# Patient Record
Sex: FEMALE | Race: WHITE | NOT HISPANIC OR LATINO | Employment: OTHER | URBAN - METROPOLITAN AREA
[De-identification: names, ages, dates, MRNs, and addresses within clinical notes are randomized per-mention and may not be internally consistent; named-entity substitution may affect disease eponyms.]

---

## 2022-05-24 ENCOUNTER — ANNUAL EXAM (OUTPATIENT)
Dept: OBGYN CLINIC | Facility: CLINIC | Age: 72
End: 2022-05-24
Payer: MEDICARE

## 2022-05-24 VITALS
SYSTOLIC BLOOD PRESSURE: 130 MMHG | BODY MASS INDEX: 32.13 KG/M2 | WEIGHT: 188.2 LBS | DIASTOLIC BLOOD PRESSURE: 75 MMHG | HEIGHT: 64 IN

## 2022-05-24 DIAGNOSIS — Z01.419 ROUTINE GYNECOLOGICAL EXAMINATION: Primary | ICD-10-CM

## 2022-05-24 DIAGNOSIS — L90.0 LICHEN SCLEROSUS: ICD-10-CM

## 2022-05-24 DIAGNOSIS — E28.39 ESTROGEN DEFICIENCY: ICD-10-CM

## 2022-05-24 DIAGNOSIS — Z12.31 ENCOUNTER FOR SCREENING MAMMOGRAM FOR MALIGNANT NEOPLASM OF BREAST: ICD-10-CM

## 2022-05-24 DIAGNOSIS — Z13.820 OSTEOPOROSIS SCREENING: ICD-10-CM

## 2022-05-24 PROCEDURE — G0101 CA SCREEN;PELVIC/BREAST EXAM: HCPCS | Performed by: OBSTETRICS & GYNECOLOGY

## 2022-05-24 RX ORDER — CETIRIZINE HYDROCHLORIDE 10 MG/1
10 TABLET, CHEWABLE ORAL AS NEEDED
COMMUNITY

## 2022-05-24 RX ORDER — CHLORAL HYDRATE 500 MG
1000 CAPSULE ORAL DAILY
COMMUNITY

## 2022-05-24 RX ORDER — ALPRAZOLAM 0.25 MG/1
0.25 TABLET ORAL
COMMUNITY
Start: 2022-01-18

## 2022-05-24 RX ORDER — BUPROPION HYDROCHLORIDE 300 MG/1
300 TABLET ORAL DAILY
COMMUNITY
Start: 2022-05-11

## 2022-05-24 RX ORDER — TRIAMCINOLONE ACETONIDE 5 MG/G
OINTMENT TOPICAL 2 TIMES WEEKLY
Qty: 15 G | Refills: 3 | Status: SHIPPED | OUTPATIENT
Start: 2022-05-26

## 2022-05-24 RX ORDER — MELATONIN
1000 DAILY
COMMUNITY

## 2022-05-24 NOTE — PROGRESS NOTES
[address] [facility], [address], [address] [facility]    ASSESSMENT/PLAN: Zaida Xiao is a 70 y o   who presents for annual gynecologic exam     Encounter for routine gynecologic examination  - Routine well woman exam completed today  - Cervical Cancer Screening: Current ASCCP Guidelines reviewed  Last Pap: Not on file none   Next Pap Due: na  No   - COVID vaccine  Pfizser x  3    - Breast Cancer Screening: Last Mammogram 2018   Sisters  Half w  Breast cancer    - Colorectal cancer screening + polyp  Due in    - The following were reviewed in today's visit: breast self exam, mammography screening ordered, menopause, osteoporosis, adequate intake of calcium and vitamin D, exercise, healthy diet, DEXA ordered and colonoscopy discussed     Additional problems addressed during this visit:  1  Routine gynecological examination    2  Encounter for screening mammogram for malignant neoplasm of breast  -     Mammo screening bilateral w 3d & cad; Future    3  BMI 32 0-32 9,adult    4  Lichen sclerosus  -     triamcinolone (KENALOG) 0 5 % ointment; Apply topically 2 (two) times a week    5  Estrogen deficiency  -     DXA bone density spine hip and pelvis; Future    6  Osteoporosis screening  -     DXA bone density spine hip and pelvis; Future        CC:  Annual Gynecologic Examination    HPI: Zaida Xiao is a 70 y o  Sarthak Any who presents for annual gynecologic examination  69 yo here for wellness exaam   + hx of lichen sclerosis  Past medical history significant for elevated blood pressure reading without diagnosis of hypertension, depression, obesity, last colonoscopy was 2019 and [facility]   Surgical history for hysterectomy in ,  vulvar biopsy in  for early LS at 2016 torn meniscus to the left knee  Family history father  of melanoma in 2016,  2 half sisters with breast cancer          The following portions of the patient's history were reviewed and updated as appropriate: She  has a past medical history of Depression, Hypertension, Lichen sclerosus, Malignant melanoma of left upper extremity including shoulder ([address] ), and Malignant melanoma of upper extremity, including shoulder ([facility] [address] )  She  has a past surgical history that includes Colonoscopy (2019); Hysterectomy; Knee surgery (Left, 2016); and Cataract extraction, bilateral (Bilateral)  Her family history includes Breast cancer in her half-sister and paternal aunt; Dementia in her sister; Heart block in her sister; Hypertension in her father; Melanoma in her father; Throat cancer in her sister  She  reports that she has quit smoking  She has never used smokeless tobacco  She reports previous alcohol use  She reports that she does not use drugs  Current Outpatient Medications   Medication Sig Dispense Refill    ALPRAZolam (XANAX) 0 25 mg tablet Take 0 25 mg by mouth      buPROPion (WELLBUTRIN XL) 300 mg 24 hr tablet Take 300 mg by mouth in the morning   cetirizine (ZyrTEC) 10 MG chewable tablet Chew 10 mg if needed for allergies      cholecalciferol (VITAMIN D3) 1,000 units tablet Take 1,000 Units by mouth in the morning   Omega-3 Fatty Acids (fish oil) 1,000 mg Take 1,000 mg by mouth in the morning   sertraline (ZOLOFT) 50 mg tablet Pt taking needed, wants to ween herself off      [START ON 5/26/2022] triamcinolone (KENALOG) 0 5 % ointment Apply topically 2 (two) times a week 15 g 3     No current facility-administered medications for this visit  She is allergic to gatifloxacin       Review of Systems:  All systems normal except as noted in HPI          Objective:  /75 (BP Location: Left arm, Patient Position: Sitting, Cuff Size: Standard)   Ht 5' 3 5" (1 613 m)   Wt 85 4 kg (188 lb 3 2 oz)   BMI 32 82 kg/m²    Physical Exam  Vitals and nursing note reviewed  Constitutional:       Appearance: Normal appearance  HENT:      Head: Normocephalic     Cardiovascular:      Rate and Rhythm: Normal rate and regular rhythm  Pulmonary:      Effort: Pulmonary effort is normal       Breath sounds: Normal breath sounds  Chest:      Chest wall: No mass, lacerations, swelling, tenderness or edema  Breasts: Adis Score is 4  Breasts are symmetrical       Right: Normal  No swelling, bleeding, inverted nipple, mass, nipple discharge, skin change, tenderness, axillary adenopathy or supraclavicular adenopathy  Left: No swelling, bleeding, inverted nipple, mass, nipple discharge, skin change, tenderness, axillary adenopathy or supraclavicular adenopathy  Abdominal:      General: Abdomen is flat  Bowel sounds are normal       Palpations: Abdomen is soft  Genitourinary:     General: Normal vulva  Exam position: Lithotomy position  Pubic Area: No rash  Adis stage (genital): 4       Labia:         Right: No rash, tenderness or lesion  Left: No rash, tenderness or lesion  Urethra: No urethral pain, urethral swelling or urethral lesion  Vagina: Normal       Cervix: No cervical motion tenderness or discharge  Adnexa: Right adnexa normal and left adnexa normal       Rectum: Normal        Musculoskeletal:         General: Normal range of motion  Cervical back: Neck supple  Lymphadenopathy:      Upper Body:      Right upper body: No supraclavicular, axillary or pectoral adenopathy  Left upper body: No supraclavicular, axillary or pectoral adenopathy  Lower Body: No right inguinal adenopathy  No left inguinal adenopathy  Skin:     General: Skin is warm and dry  Neurological:      General: No focal deficit present  Mental Status: She is alert and oriented to person, place, and time  Psychiatric:         Mood and Affect: Mood normal          Behavior: Behavior normal          Thought Content:  Thought content normal          Judgment: Judgment normal

## 2022-07-05 DIAGNOSIS — Z13.820 OSTEOPOROSIS SCREENING: ICD-10-CM

## 2022-07-05 DIAGNOSIS — E28.39 ESTROGEN DEFICIENCY: ICD-10-CM

## 2022-07-12 DIAGNOSIS — Z12.31 ENCOUNTER FOR SCREENING MAMMOGRAM FOR MALIGNANT NEOPLASM OF BREAST: ICD-10-CM

## 2023-06-05 ENCOUNTER — OFFICE VISIT (OUTPATIENT)
Dept: OBGYN CLINIC | Facility: CLINIC | Age: 73
End: 2023-06-05
Payer: MEDICARE

## 2023-06-05 VITALS
HEIGHT: 64 IN | DIASTOLIC BLOOD PRESSURE: 74 MMHG | SYSTOLIC BLOOD PRESSURE: 144 MMHG | WEIGHT: 195 LBS | BODY MASS INDEX: 33.29 KG/M2

## 2023-06-05 DIAGNOSIS — E28.39 ESTROGEN DEFICIENCY: ICD-10-CM

## 2023-06-05 DIAGNOSIS — Z78.0 POSTMENOPAUSAL: Primary | ICD-10-CM

## 2023-06-05 DIAGNOSIS — N39.3 SUI (STRESS URINARY INCONTINENCE, FEMALE): ICD-10-CM

## 2023-06-05 DIAGNOSIS — Z12.31 ENCOUNTER FOR SCREENING MAMMOGRAM FOR MALIGNANT NEOPLASM OF BREAST: ICD-10-CM

## 2023-06-05 DIAGNOSIS — L90.0 LICHEN SCLEROSUS: ICD-10-CM

## 2023-06-05 PROBLEM — Z01.419 ROUTINE GYNECOLOGICAL EXAMINATION: Status: ACTIVE | Noted: 2023-06-05

## 2023-06-05 PROCEDURE — 99213 OFFICE O/P EST LOW 20 MIN: CPT | Performed by: OBSTETRICS & GYNECOLOGY

## 2023-06-05 RX ORDER — TRIAMCINOLONE ACETONIDE 5 MG/G
OINTMENT TOPICAL 2 TIMES WEEKLY
Qty: 15 G | Refills: 3 | Status: SHIPPED | OUTPATIENT
Start: 2023-06-05

## 2023-06-05 RX ORDER — KETOCONAZOLE 20 MG/ML
SHAMPOO TOPICAL
COMMUNITY
Start: 2023-06-01

## 2023-06-05 NOTE — PROGRESS NOTES
[address] [facility], [address], [address] [facility]    ASSESSMENT/PLAN: Annie Kumari is a 67 y o  Kedar Balbuena who presents for annual gynecologic exam       - gyn  Fu   exam completed today  - Cervical Cancer Screening: Current ASCCP Guidelines reviewed  Last Pap: 05/24/2022     - Breast Cancer Screening: Last Mammogram 06/30/2022,   - Colorectal cancer screening was not ordered  Pt is scheduled in   One month   - The following were reviewed in today's visit: breast self exam, menopause, osteoporosis, adequate intake of calcium and vitamin D, exercise, healthy diet and colonoscopy discussed and ordered    Additional problems addressed during this visit:  1  Postmenopausal    2  Estrogen deficiency    3  Lichen sclerosus  -     triamcinolone (KENALOG) 0 5 % ointment; Apply topically 2 (two) times a week    4  Encounter for screening mammogram for malignant neoplasm of breast  -     Mammo screening bilateral w 3d & cad; Future        CC:  Annual Gynecologic Examination    HPI: Annie Kumari is a 67 y o  Kedar Balbuena who presents for annual gynecologic examination  66 yo here for gyn fu exam    Not using  Her   Steroid ointment   Forgets  Some  Off and on  Itching  No bleeding        The following portions of the patient's history were reviewed and updated as appropriate: She  has a past medical history of Depression, Hypertension, Lichen sclerosus, Malignant melanoma of left upper extremity including shoulder ([address] ), Malignant melanoma of upper extremity, including shoulder ([address] ), Osteopenia after menopause (06/30/2022), and Screening for breast cancer (06/30/2022)  She  has a past surgical history that includes Colonoscopy (2019); Hysterectomy; Knee surgery (Left, 2016); and Cataract extraction, bilateral (Bilateral)  Her family history includes Breast cancer in her half-sister and paternal aunt; Dementia in her sister; Heart block in her sister;  Hypertension in her father; Melanoma in her father; "Throat cancer in her sister  She  reports that she has quit smoking  She has never been exposed to tobacco smoke  She has never used smokeless tobacco  She reports that she does not currently use alcohol  She reports that she does not use drugs  Current Outpatient Medications   Medication Sig Dispense Refill   • ALPRAZolam (XANAX) 0 25 mg tablet Take 0 25 mg by mouth     • buPROPion (WELLBUTRIN XL) 300 mg 24 hr tablet Take 300 mg by mouth in the morning  • cetirizine (ZyrTEC) 10 MG chewable tablet Chew 10 mg if needed for allergies     • cholecalciferol (VITAMIN D3) 1,000 units tablet Take 1,000 Units by mouth in the morning  • ketoconazole (NIZORAL) 2 % shampoo      • Omega-3 Fatty Acids (fish oil) 1,000 mg Take 1,000 mg by mouth in the morning  • sertraline (ZOLOFT) 50 mg tablet Pt taking needed, wants to ween herself off     • triamcinolone (KENALOG) 0 5 % ointment Apply topically 2 (two) times a week 15 g 3     No current facility-administered medications for this visit  She is allergic to gatifloxacin       Review of Systems:  All systems normal except as noted in HPI          Objective:  /74 (BP Location: Left arm, Patient Position: Sitting, Cuff Size: Standard)   Ht 5' 4\" (1 626 m)   Wt 88 5 kg (195 lb)   BMI 33 47 kg/m²    Physical Exam  Vitals and nursing note reviewed  Constitutional:       Appearance: Normal appearance  HENT:      Head: Normocephalic  Cardiovascular:      Rate and Rhythm: Normal rate and regular rhythm  Pulses: Normal pulses  Heart sounds: Normal heart sounds  Pulmonary:      Effort: Pulmonary effort is normal       Breath sounds: Normal breath sounds  Chest:      Chest wall: No mass, lacerations, swelling, tenderness or edema  Breasts: Adis Score is 4  Breasts are symmetrical       Right: Normal  No swelling, bleeding, inverted nipple, mass, nipple discharge, skin change or tenderness        Left: No swelling, bleeding, inverted " nipple, mass, nipple discharge, skin change or tenderness  Abdominal:      General: Abdomen is flat  Bowel sounds are normal       Palpations: Abdomen is soft  Genitourinary:     General: Normal vulva  Exam position: Lithotomy position  Pubic Area: No rash  Adis stage (genital): 4       Labia:         Right: No rash, tenderness or lesion  Left: No rash, tenderness or lesion  Urethra: No urethral pain, urethral swelling or urethral lesion  Vagina: Normal       Cervix: No cervical motion tenderness or discharge  Uterus: Absent  Adnexa: Right adnexa normal and left adnexa normal       Rectum: Normal              Musculoskeletal:         General: Normal range of motion  Cervical back: Neck supple  Lymphadenopathy:      Upper Body:      Right upper body: No supraclavicular, axillary or pectoral adenopathy  Left upper body: No supraclavicular, axillary or pectoral adenopathy  Lower Body: No right inguinal adenopathy  No left inguinal adenopathy  Skin:     General: Skin is warm and dry  Neurological:      General: No focal deficit present  Mental Status: She is alert and oriented to person, place, and time     Psychiatric:         Mood and Affect: Mood normal

## 2023-06-05 NOTE — PATIENT INSTRUCTIONS
Calcium 1200-1500mg + 800-1000 IU Vit D daily unless otherwise directed  Avoid falls  Exercise 150 minutes per week minimum including weight bearing exercises  DEXA scan-   2024      No further paps after age 72 as long as there has been adequate normal paps completed, no history of MELISSA 2  or a more severe pap diagnosis in the last 20 years  Annual mammogram and monthly breast self exam recommended   Colonoscopy-  due  now         Kegels 20 times twice daily  Silicone based lubricant with sex  Vaginal moisturizers twice weekly as needed

## 2023-08-04 PROBLEM — Z01.419 ROUTINE GYNECOLOGICAL EXAMINATION: Status: RESOLVED | Noted: 2023-06-05 | Resolved: 2023-08-04

## 2024-06-10 ENCOUNTER — ANNUAL EXAM (OUTPATIENT)
Dept: OBGYN CLINIC | Facility: CLINIC | Age: 74
End: 2024-06-10
Payer: MEDICARE

## 2024-06-10 VITALS
SYSTOLIC BLOOD PRESSURE: 126 MMHG | DIASTOLIC BLOOD PRESSURE: 80 MMHG | HEIGHT: 63 IN | BODY MASS INDEX: 33.66 KG/M2 | WEIGHT: 190 LBS

## 2024-06-10 DIAGNOSIS — Z78.0 POSTMENOPAUSAL: ICD-10-CM

## 2024-06-10 DIAGNOSIS — Z12.31 ENCOUNTER FOR SCREENING MAMMOGRAM FOR MALIGNANT NEOPLASM OF BREAST: ICD-10-CM

## 2024-06-10 DIAGNOSIS — Z13.820 OSTEOPOROSIS SCREENING: ICD-10-CM

## 2024-06-10 DIAGNOSIS — N39.3 SUI (STRESS URINARY INCONTINENCE, FEMALE): ICD-10-CM

## 2024-06-10 DIAGNOSIS — E28.39 ESTROGEN DEFICIENCY: ICD-10-CM

## 2024-06-10 DIAGNOSIS — Z01.411 ENCOUNTER FOR GYNECOLOGICAL EXAMINATION WITH ABNORMAL FINDING: Primary | ICD-10-CM

## 2024-06-10 DIAGNOSIS — L90.0 LICHEN SCLEROSUS: ICD-10-CM

## 2024-06-10 PROBLEM — Z01.419 ENCOUNTER FOR GYNECOLOGICAL EXAMINATION WITHOUT ABNORMAL FINDING: Status: ACTIVE | Noted: 2024-06-10

## 2024-06-10 PROCEDURE — G0101 CA SCREEN;PELVIC/BREAST EXAM: HCPCS | Performed by: OBSTETRICS & GYNECOLOGY

## 2024-06-10 RX ORDER — TRIAMCINOLONE ACETONIDE 5 MG/G
OINTMENT TOPICAL 2 TIMES WEEKLY
Qty: 15 G | Refills: 3 | Status: SHIPPED | OUTPATIENT
Start: 2024-06-10

## 2024-06-10 NOTE — PATIENT INSTRUCTIONS
Calcium 1200-1500mg + 800-1000 IU Vit D daily unless otherwise directed. Avoid falls. Exercise 150 minutes per week minimum including weight bearing exercises. DEXA scan-  order given      . No further paps after age 65 as long as there has been adequate normal paps completed, no history of MELISSA 2  or a more severe pap diagnosis in the last 20 years.   Annual mammogram and monthly breast self exam recommended .   Colonoscopy-    due in 4 years     .  Kegels 20 times twice daily. Silicone based lubricant with sex. Vaginal moisturizers twice weekly as needed.

## 2024-06-10 NOTE — PROGRESS NOTES
[facility] - [address], [address], [place] [address]    ASSESSMENT/PLAN: Karie Dolan is a 74 y.o.  who presents for annual gynecologic exam.    Encounter for routine gynecologic examination  - Routine well woman exam completed today.  - Cervical Cancer Screening: Current ASCCP Guidelines reviewed. Last Pap: 2022 . Next Pap Due: over age 65  - COVID vaccine   - Breast Cancer Screening: Last Mammogram 2022,   - Colorectal cancer screening was not ordered.  - The following were reviewed in today's visit: breast self exam, mammography screening ordered, adequate intake of calcium and vitamin D, exercise, healthy diet, and colonoscopy discussed and ordered    Additional problems addressed during this visit:  1. Encounter for gynecological examination with abnormal finding  2. Encounter for screening mammogram for malignant neoplasm of breast  -     Mammo screening bilateral w 3d & cad; Future  3. Postmenopausal  -     DXA bone density spine hip and pelvis; Future  4. Lichen sclerosus  Comments:  reviewed with pt  use of   steroid cream  at least  twice a week  and Aquaphor between  Orders:  -     triamcinolone (KENALOG) 0.5 % ointment; Apply topically 2 (two) times a week  5. APRIL (stress urinary incontinence, female)  6. Estrogen deficiency  -     DXA bone density spine hip and pelvis; Future  7. Osteoporosis screening  -     DXA bone density spine hip and pelvis; Future      CC:  Annual Gynecologic Examination    HPI: Karie Dolan is a 74 y.o.  who presents for annual gynecologic examination.  73 yo here for wellness exxam.  No bleeding , blaoting or satiety.   No itching      Not really using her   steroid ointment  due to  no itching.       The following portions of the patient's history were reviewed and updated as appropriate: She  has a past medical history of Depression, Hypertension, Lichen sclerosus, Malignant melanoma of left upper extremity including shoulder  "(HCC), Malignant melanoma of upper extremity, including shoulder (HCC), Osteopenia after menopause (06/30/2022), and Screening for breast cancer (06/30/2022).  She  has a past surgical history that includes Colonoscopy (2023); Hysterectomy; Knee surgery (Left, 2016); and Cataract extraction, bilateral (Bilateral).  Her family history includes Breast cancer in her half-sister and paternal aunt; Dementia in her sister; Heart block in her sister; Hypertension in her father; Melanoma in her father; Throat cancer in her sister.  She  reports that she has quit smoking. She has never been exposed to tobacco smoke. She has never used smokeless tobacco. She reports that she does not currently use alcohol. She reports that she does not use drugs.  Current Outpatient Medications   Medication Sig Dispense Refill   • ALPRAZolam (XANAX) 0.25 mg tablet Take 0.25 mg by mouth     • buPROPion (WELLBUTRIN XL) 300 mg 24 hr tablet Take 300 mg by mouth in the morning.     • triamcinolone (KENALOG) 0.5 % ointment Apply topically 2 (two) times a week 15 g 3   • cholecalciferol (VITAMIN D3) 1,000 units tablet Take 1,000 Units by mouth in the morning.     • sertraline (ZOLOFT) 50 mg tablet Pt taking needed, wants to ween herself off (Patient not taking: Reported on 6/10/2024)       No current facility-administered medications for this visit.     She is allergic to gatifloxacin..    Review of Systems:  All systems normal except as noted in HPI          Objective:  /80 (BP Location: Right arm, Patient Position: Sitting, Cuff Size: Standard)   Ht 5' 2.5\" (1.588 m)   Wt 86.2 kg (190 lb)   BMI 34.20 kg/m²    Physical Exam  Vitals and nursing note reviewed.   Constitutional:       Appearance: Normal appearance.   HENT:      Head: Normocephalic.   Cardiovascular:      Rate and Rhythm: Normal rate and regular rhythm.      Pulses: Normal pulses.      Heart sounds: Normal heart sounds.   Pulmonary:      Effort: Pulmonary effort is normal.     "  Breath sounds: Normal breath sounds.   Chest:      Chest wall: No mass, lacerations, swelling, tenderness or edema.   Breasts:     Adis Score is 4.      Breasts are symmetrical.      Right: Normal. No swelling, bleeding, inverted nipple, mass, nipple discharge, skin change or tenderness.      Left: No swelling, bleeding, inverted nipple, mass, nipple discharge, skin change or tenderness.   Abdominal:      General: Abdomen is flat. Bowel sounds are normal.      Palpations: Abdomen is soft.   Genitourinary:     General: Normal vulva.      Exam position: Lithotomy position.      Pubic Area: No rash.       Adis stage (genital): 4.      Labia:         Right: Rash present. No tenderness or lesion.         Left: Rash present. No tenderness or lesion.       Urethra: No urethral pain, urethral swelling or urethral lesion.      Vagina: Normal.      Uterus: Absent.       Adnexa: Right adnexa normal and left adnexa normal.      Rectum: Normal.                Comments: wHitening noted  b/l    Musculoskeletal:         General: Normal range of motion.      Cervical back: Normal range of motion and neck supple.   Lymphadenopathy:      Upper Body:      Right upper body: No supraclavicular, axillary or pectoral adenopathy.      Left upper body: No supraclavicular, axillary or pectoral adenopathy.      Lower Body: No right inguinal adenopathy. No left inguinal adenopathy.   Skin:     General: Skin is warm and dry.   Neurological:      General: No focal deficit present.      Mental Status: She is alert and oriented to person, place, and time.   Psychiatric:         Mood and Affect: Mood normal.         Behavior: Behavior normal.         Thought Content: Thought content normal.         Judgment: Judgment normal.

## 2024-07-10 PROBLEM — Z01.419 ENCOUNTER FOR GYNECOLOGICAL EXAMINATION WITHOUT ABNORMAL FINDING: Status: RESOLVED | Noted: 2024-06-10 | Resolved: 2024-07-10

## 2024-07-10 PROBLEM — Z13.820 OSTEOPOROSIS SCREENING: Status: RESOLVED | Noted: 2024-06-10 | Resolved: 2024-07-10

## 2024-07-29 ENCOUNTER — HOSPITAL ENCOUNTER (OUTPATIENT)
Dept: HOSPITAL 99 - WDC | Age: 74
End: 2024-07-29
Payer: MEDICARE

## 2024-07-29 DIAGNOSIS — Z12.31: ICD-10-CM

## 2024-07-29 DIAGNOSIS — E28.39: ICD-10-CM

## 2024-07-29 DIAGNOSIS — Z78.0: Primary | ICD-10-CM

## 2024-08-02 DIAGNOSIS — Z78.0 POSTMENOPAUSAL: ICD-10-CM

## 2024-08-02 DIAGNOSIS — Z13.820 OSTEOPOROSIS SCREENING: ICD-10-CM

## 2024-08-02 DIAGNOSIS — E28.39 ESTROGEN DEFICIENCY: ICD-10-CM

## 2025-06-16 ENCOUNTER — OFFICE VISIT (OUTPATIENT)
Dept: OBGYN CLINIC | Facility: CLINIC | Age: 75
End: 2025-06-16
Payer: MEDICARE

## 2025-06-16 VITALS
HEIGHT: 62 IN | WEIGHT: 183 LBS | SYSTOLIC BLOOD PRESSURE: 128 MMHG | BODY MASS INDEX: 33.68 KG/M2 | DIASTOLIC BLOOD PRESSURE: 70 MMHG

## 2025-06-16 DIAGNOSIS — Z78.0 POSTMENOPAUSAL: ICD-10-CM

## 2025-06-16 DIAGNOSIS — L90.0 LICHEN SCLEROSUS: ICD-10-CM

## 2025-06-16 DIAGNOSIS — E28.39 ESTROGEN DEFICIENCY: ICD-10-CM

## 2025-06-16 DIAGNOSIS — Z12.31 ENCOUNTER FOR SCREENING MAMMOGRAM FOR MALIGNANT NEOPLASM OF BREAST: ICD-10-CM

## 2025-06-16 DIAGNOSIS — L90.0 LICHEN SCLEROSUS: Primary | ICD-10-CM

## 2025-06-16 PROBLEM — Z01.419 ENCOUNTER FOR GYNECOLOGICAL EXAMINATION WITHOUT ABNORMAL FINDING: Status: ACTIVE | Noted: 2025-06-16

## 2025-06-16 PROCEDURE — 99214 OFFICE O/P EST MOD 30 MIN: CPT | Performed by: OBSTETRICS & GYNECOLOGY

## 2025-06-16 RX ORDER — TRIAMCINOLONE ACETONIDE 5 MG/G
OINTMENT TOPICAL 2 TIMES WEEKLY
Qty: 15 G | Refills: 3 | Status: SHIPPED | OUTPATIENT
Start: 2025-06-16

## 2025-06-16 RX ORDER — KETOCONAZOLE 20 MG/ML
SHAMPOO, SUSPENSION TOPICAL
COMMUNITY
Start: 2025-06-09

## 2025-06-16 RX ORDER — LISINOPRIL 20 MG/1
TABLET ORAL
COMMUNITY
Start: 2025-05-09

## 2025-06-16 RX ORDER — TERBINAFINE HYDROCHLORIDE 250 MG/1
1 TABLET ORAL DAILY
COMMUNITY
Start: 2025-05-13

## 2025-06-16 NOTE — PROGRESS NOTES
[facility] - [address], [address], [place] [address]    ASSESSMENT/PLAN: Karie Dolan is a 75 y.o.  who presents for  fu gynecologic exam.    Encounter for  fu  gynecologic examination  - Routine well woman exam completed today.  - Cervical Cancer Screening: Current ASCCP Guidelines reviewed. Last Pap: 06/10/2024 . Next Pap Due: over  65  - Breast Cancer Screening: Last Mammogram 2022, 2024  - Colorectal cancer screening was not ordered.  - The following were reviewed in today's visit: breast self exam, mammography screening ordered, menopause, adequate intake of calcium and vitamin D, exercise, healthy diet, and colonoscopy discussed     Additional problems addressed during this visit:  1. Lichen sclerosus  -     triamcinolone (KENALOG) 0.5 % ointment; Apply topically 2 (two) times a week  2. Encounter for screening mammogram for malignant neoplasm of breast  -     Mammo screening bilateral w 3d and cad; Future  3. Postmenopausal  4. BMI 32.0-32.9,adult  5. Estrogen deficiency  6. Lichen sclerosus  Comments:  reviewed with pt  use of   steroid cream  at least  twice a week  and Aquaphor between  Orders:  -     triamcinolone (KENALOG) 0.5 % ointment; Apply topically 2 (two) times a week      CC:  fu  Gynecologic Examination    HPI: Karie Dolan is a 75 y.o.  who presents for annual gynecologic examination.  HPI    The following portions of the patient's history were reviewed and updated as appropriate: She  has a past medical history of Depression, Hypertension, Lichen sclerosus, Malignant melanoma of left upper extremity including shoulder (HCC), Malignant melanoma of upper extremity, including shoulder (HCC), Osteopenia after menopause (2022), and Screening for breast cancer (2024).  She  has a past surgical history that includes Colonoscopy (); Hysterectomy; Knee surgery (Left, 2016); and Cataract extraction, bilateral (Bilateral).  Her family  "history includes Breast cancer in her half-sister and paternal aunt; Dementia in her sister; Heart block in her sister; Hypertension in her father; Melanoma in her father; Throat cancer in her sister.  She  reports that she has quit smoking. She has never been exposed to tobacco smoke. She has never used smokeless tobacco. She reports that she does not currently use alcohol. She reports that she does not use drugs.  Current Outpatient Medications   Medication Sig Dispense Refill   • ALPRAZolam (XANAX) 0.25 mg tablet Take 0.25 mg by mouth     • buPROPion (WELLBUTRIN XL) 300 mg 24 hr tablet Take 300 mg by mouth in the morning.     • cholecalciferol (VITAMIN D3) 1,000 units tablet Take 1,000 Units by mouth in the morning.     • esomeprazole (NexIUM) 20 mg capsule Take 20 mg by mouth every morning before breakfast     • ketoconazole (NIZORAL) 2 % shampoo SHAMPOO 1-3 TIMES WEEKLY TO SCALP. LATHER AND WAIT 10 MINUTES BEFORE RINSING     • lisinopril (ZESTRIL) 20 mg tablet      • semaglutide, 0.25 or 0.5 mg/dose, (Ozempic) 2 mg/1.5 mL injection pen Inject under the skin     • terbinafine (LamISIL) 250 mg tablet Take 1 tablet by mouth in the morning     • triamcinolone (KENALOG) 0.5 % ointment Apply topically 2 (two) times a week 15 g 3   • sertraline (ZOLOFT) 50 mg tablet Pt taking needed, wants to ween herself off (Patient not taking: Reported on 6/16/2025)       No current facility-administered medications for this visit.     She is allergic to gatifloxacin..    Review of Systems:  All systems normal except as noted in HPI          Objective:  /70 (BP Location: Left arm, Patient Position: Sitting, Cuff Size: Standard)   Ht 5' 2.25\" (1.581 m)   Wt 83 kg (183 lb)   BMI 33.20 kg/m²    Physical Exam  Vitals and nursing note reviewed.   Constitutional:       Appearance: Normal appearance.   HENT:      Head: Normocephalic.     Cardiovascular:      Rate and Rhythm: Normal rate and regular rhythm.      Pulses: Normal " pulses.      Heart sounds: Normal heart sounds.   Pulmonary:      Effort: Pulmonary effort is normal.      Breath sounds: Normal breath sounds.   Chest:      Chest wall: No mass, lacerations, swelling, tenderness or edema.   Breasts:     Adis Score is 4.      Breasts are symmetrical.      Right: Normal. No swelling, bleeding, inverted nipple, mass, nipple discharge, skin change or tenderness.      Left: No swelling, bleeding, inverted nipple, mass, nipple discharge, skin change or tenderness.   Abdominal:      General: Abdomen is flat. Bowel sounds are normal.      Palpations: Abdomen is soft.   Genitourinary:     General: Normal vulva.      Exam position: Lithotomy position.      Pubic Area: No rash.       Adis stage (genital): 4.      Labia:         Right: No rash, tenderness or lesion.         Left: No rash, tenderness or lesion.       Urethra: No urethral pain, urethral swelling or urethral lesion.      Vagina: Normal.      Cervix: No cervical motion tenderness or discharge.      Uterus: Normal.       Adnexa: Right adnexa normal and left adnexa normal.      Rectum: Normal.        Comments: Pale pink  scant  whitening     Musculoskeletal:         General: Normal range of motion.      Cervical back: Normal range of motion and neck supple.   Lymphadenopathy:      Upper Body:      Right upper body: No supraclavicular, axillary or pectoral adenopathy.      Left upper body: No supraclavicular, axillary or pectoral adenopathy.      Lower Body: No right inguinal adenopathy. No left inguinal adenopathy.     Skin:     General: Skin is warm and dry.     Neurological:      General: No focal deficit present.      Mental Status: She is alert and oriented to person, place, and time.     Psychiatric:         Mood and Affect: Mood normal.         Behavior: Behavior normal.         Thought Content: Thought content normal.         Judgment: Judgment normal.

## 2025-06-16 NOTE — PATIENT INSTRUCTIONS
Calcium 1200-1500mg + 800-1000 IU Vit D daily unless otherwise directed. Avoid falls. Exercise 150 minutes per week minimum including weight bearing exercises. DEXA scan-   next year     . No further paps after age 65 as long as there has been adequate normal paps completed, no history of MELISSA 2  or a more severe pap diagnosis in the last 20 years.   Annual mammogram and monthly breast self exam recommended .   Colonoscopy-  2 years      .  Kegels 20 times twice daily. Silicone based lubricant with sex. Vaginal moisturizers twice weekly as needed.

## 2025-07-30 ENCOUNTER — HOSPITAL ENCOUNTER (OUTPATIENT)
Dept: HOSPITAL 99 - WDC | Age: 75
End: 2025-07-30
Payer: MEDICARE

## 2025-07-30 DIAGNOSIS — Z12.31: Primary | ICD-10-CM
